# Patient Record
Sex: FEMALE | Race: BLACK OR AFRICAN AMERICAN | NOT HISPANIC OR LATINO | Employment: FULL TIME | URBAN - METROPOLITAN AREA
[De-identification: names, ages, dates, MRNs, and addresses within clinical notes are randomized per-mention and may not be internally consistent; named-entity substitution may affect disease eponyms.]

---
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
## 2022-07-24 ENCOUNTER — HEALTH MAINTENANCE LETTER (OUTPATIENT)
Age: 22
End: 2022-07-24

## 2022-10-03 ENCOUNTER — HEALTH MAINTENANCE LETTER (OUTPATIENT)
Age: 22
End: 2022-10-03

## 2023-08-12 ENCOUNTER — HEALTH MAINTENANCE LETTER (OUTPATIENT)
Age: 23
End: 2023-08-12

## 2023-10-10 ENCOUNTER — HOSPITAL ENCOUNTER (EMERGENCY)
Facility: HOSPITAL | Age: 23
Discharge: HOME | End: 2023-10-10
Attending: EMERGENCY MEDICINE | Admitting: EMERGENCY MEDICINE

## 2023-10-10 VITALS
HEART RATE: 96 BPM | HEIGHT: 64 IN | WEIGHT: 140 LBS | OXYGEN SATURATION: 100 % | DIASTOLIC BLOOD PRESSURE: 80 MMHG | SYSTOLIC BLOOD PRESSURE: 127 MMHG | RESPIRATION RATE: 16 BRPM | TEMPERATURE: 98 F | BODY MASS INDEX: 23.9 KG/M2

## 2023-10-10 DIAGNOSIS — N39.0 UTI (URINARY TRACT INFECTION), UNCOMPLICATED: ICD-10-CM

## 2023-10-10 LAB
ALBUMIN UR-MCNC: 300 MG/DL
APPEARANCE UR: ABNORMAL
BACTERIA #/AREA URNS HPF: ABNORMAL /HPF
BILIRUB UR QL STRIP: NEGATIVE
COLOR UR AUTO: YELLOW
GLUCOSE UR STRIP-MCNC: NEGATIVE MG/DL
HCG UR QL: NEGATIVE
HGB UR QL STRIP: ABNORMAL
KETONES UR STRIP-MCNC: NEGATIVE MG/DL
LEUKOCYTE ESTERASE UR QL STRIP: ABNORMAL
MUCOUS THREADS #/AREA URNS LPF: PRESENT /LPF
NITRATE UR QL: NEGATIVE
PH UR STRIP: 6.5 [PH] (ref 5–7)
RBC URINE: >182 /HPF
SP GR UR STRIP: 1.02 (ref 1–1.03)
SQUAMOUS EPITHELIAL: 9 /HPF
UROBILINOGEN UR STRIP-MCNC: <2 MG/DL
WBC URINE: >182 /HPF

## 2023-10-10 PROCEDURE — 99284 EMERGENCY DEPT VISIT MOD MDM: CPT

## 2023-10-10 PROCEDURE — 81001 URINALYSIS AUTO W/SCOPE: CPT | Performed by: EMERGENCY MEDICINE

## 2023-10-10 PROCEDURE — 250N000011 HC RX IP 250 OP 636

## 2023-10-10 PROCEDURE — 87086 URINE CULTURE/COLONY COUNT: CPT | Performed by: EMERGENCY MEDICINE

## 2023-10-10 PROCEDURE — 96372 THER/PROPH/DIAG INJ SC/IM: CPT

## 2023-10-10 PROCEDURE — 81025 URINE PREGNANCY TEST: CPT | Performed by: EMERGENCY MEDICINE

## 2023-10-10 RX ORDER — KETOROLAC TROMETHAMINE 30 MG/ML
30 INJECTION, SOLUTION INTRAMUSCULAR; INTRAVENOUS ONCE
Status: COMPLETED | OUTPATIENT
Start: 2023-10-10 | End: 2023-10-10

## 2023-10-10 RX ORDER — CIPROFLOXACIN 500 MG/1
500 TABLET, FILM COATED ORAL 2 TIMES DAILY
Qty: 14 TABLET | Refills: 0 | Status: SHIPPED | OUTPATIENT
Start: 2023-10-10 | End: 2023-10-17

## 2023-10-10 RX ADMIN — KETOROLAC TROMETHAMINE 30 MG: 30 INJECTION INTRAMUSCULAR; INTRAVENOUS at 09:41

## 2023-10-10 NOTE — DISCHARGE INSTRUCTIONS
Interval History: Complains of intractable nausea/vomiting. Vomited upon entering room.    Review of Systems: 10 systems reviewed all pertinent positives and negatives stated in HPI, otherwise negative.    Objective:     Vital Signs (Most Recent):  Temp: 98.9 °F (37.2 °C) (05/25/22 0529)  Pulse: 101 (05/25/22 0529)  Resp: 18 (05/24/22 1949)  BP: (!) 141/92 (05/25/22 0529)  SpO2: 100 % (05/25/22 0816)   Vital Signs (24h Range):  Temp:  [98.4 °F (36.9 °C)-98.9 °F (37.2 °C)] 98.9 °F (37.2 °C)  Pulse:  [] 101  Resp:  [18] 18  SpO2:  [97 %-100 %] 100 %  BP: (141-187)/() 141/92     Weight: 63.4 kg (139 lb 12.8 oz)  Body mass index is 25.57 kg/m².    Intake/Output Summary (Last 24 hours) at 5/25/2022 0828  Last data filed at 5/24/2022 1800  Gross per 24 hour   Intake 240 ml   Output --   Net 240 ml      Physical Exam  Constitutional:       Appearance: Normal appearance.   HENT:      Head: Normocephalic and atraumatic.      Nose: Nose normal.      Mouth/Throat:      Mouth: Mucous membranes are moist.      Pharynx: Oropharynx is clear.   Eyes:      Extraocular Movements: Extraocular movements intact.      Pupils: Pupils are equal, round, and reactive to light.   Cardiovascular:      Rate and Rhythm: Normal rate and regular rhythm.      Heart sounds: Normal heart sounds.   Pulmonary:      Effort: Pulmonary effort is normal.      Breath sounds: Normal breath sounds.   Abdominal:      General: Abdomen is flat. There is no distension.      Palpations: Abdomen is soft.      Tenderness: There is no abdominal tenderness.   Musculoskeletal:         General: Normal range of motion.      Cervical back: Normal range of motion. No rigidity.   Skin:     General: Skin is warm and dry.   Neurological:      General: No focal deficit present.      Mental Status: She is alert and oriented to person, place, and time. Mental status is at baseline.   Psychiatric:         Attention and Perception: Attention normal.         Mood and  You were seen today in the emergency department for your symptoms. I sent a prescription for a course of antibiotics to treat an Urinary Tract Infection.  Please follow-up with a primary care provider for symptom recheck.  Please return to the emergency department if you develop any new or worsening symptoms including fevers, chills, back pain, worse abdominal pain, increased blood in your urine.    Affect: Mood is depressed. Affect is flat.         Speech: Speech normal.         Behavior: Behavior normal. Behavior is cooperative.         Thought Content: Thought content normal.         Cognition and Memory: Cognition and memory normal.         Judgment: Judgment normal.        Significant Labs: All pertinent labs within the past 24 hours have been reviewed.    Significant Imaging: I have reviewed all pertinent imaging results/findings within the past 24 hours.

## 2023-10-10 NOTE — ED PROVIDER NOTES
Emergency Department Midlevel Supervisory Note    Date/Time:10/10/2023 8:38 AM    I personally saw the patient and performed a substantive portion of the visit including all aspects of the medical decision making.    I am seeing this patient along with Sunitha Michel PA-C.  I, Mian Yates D.O., have reviewed the documentation, personally taken the patient's history, performed an exam and agree with the physical finds, diagnosis and management plan.    Prior records were reviewed.  I personally performed history and physical.  I personally reviewed lab, EKG, and radiology results as indicated.  Care was discussed with mid-level provider.  Diagnosis and disposition were discussed.  Final disposition will be per the [facility] depending diagnostic studies and patient's clinical trajectory.      ED Course:  9:17 AM Patient was staffed with me by Sunitha Michel PA-C.  9:20 AM I met with the patient to gather history and to perform my initial exam. I discussed the plan for care while in the Emergency Department. Appropriate PPE was worn during patient encounters.    The patient presented to the emergency department today complaining of lower abdominal discomfort and dysuria and.  There is mild tenderness over the bladder on exam.  She does have white blood cells present on her urinalysis.  Given her symptoms, I feel would be reasonable to start her on antibiotics for a presumptive UTI.  She otherwise appears well and I feel she can be safely discharged home.  No other testing felt necessary at this time.  She has been instructed to return for worsening symptoms or other concerns.        DIAGNOSIS:  1. UTI (urinary tract infection), uncomplicated          Medications:  Discharge Medication List as of 10/10/2023  9:56 AM        START taking these medications    Details   ciprofloxacin (CIPRO) 500 MG tablet Take 1 tablet (500 mg) by mouth 2 times daily for 7 days, Disp-14 tablet, R-0, E-Prescribe             Labs and  Imaging:  Results for orders placed or performed during the hospital encounter of 10/10/23   UA with Microscopic reflex to Culture    Specimen: Urine, Midstream   Result Value Ref Range    Color Urine Yellow Colorless, Straw, Light Yellow, Yellow    Appearance Urine Cloudy (A) Clear    Glucose Urine Negative Negative mg/dL    Bilirubin Urine Negative Negative    Ketones Urine Negative Negative mg/dL    Specific Gravity Urine 1.020 1.001 - 1.030    Blood Urine >1.0 mg/dL (A) Negative    pH Urine 6.5 5.0 - 7.0    Protein Albumin Urine 300 (A) Negative mg/dL    Urobilinogen Urine <2.0 <2.0 mg/dL    Nitrite Urine Negative Negative    Leukocyte Esterase Urine 500 Aleja/uL (A) Negative    Bacteria Urine Many (A) None Seen /HPF    Mucus Urine Present (A) None Seen /LPF    RBC Urine >182 (H) <=2 /HPF    WBC Urine >182 (H) <=5 /HPF    Squamous Epithelials Urine 9 (H) <=1 /HPF   HCG qualitative urine   Result Value Ref Range    hCG Urine Qualitative Negative Negative         I, Ariella Genao, am serving as a scribe to document services personally performed by Dr. Yates based on my observation and the provider's statements to me. I, Mian Yates, DO attest that Ariella Genao is acting in a scribe capacity, has observed my performance of the services and has documented them in accordance with my direction.    Mian Yates D.O.  Emergency Medicine  [facility] EMERGENCY DEPARTMENT  [facility] [facility] [address]  [phone]  Dept: [phone]     Mian Yates MD  11/13/23 0254

## 2023-10-10 NOTE — ED TRIAGE NOTES
"Patient reports that she had a normal period the end of September, then started having vaginal bleeding again October 6.  Pt states that \"not much bleeding now\"- \"wonder if I have a bladder infection.\"  Pt states \"when I pee, it hurts.\"        "

## 2023-10-10 NOTE — ED PROVIDER NOTES
EMERGENCY DEPARTMENT ENCOUNTER      NAME: Madai Sharma  AGE: 22 year old female  YOB: 2000  MRN: [id number]  EVALUATION DATE & TIME: 10/10/23 8:39 AM    PCP: No primary care provider on file.    ED PROVIDER: Sunitha Michel PA-C      CHIEF COMPLAINT:  Hematuria and lower abdominal discomfort      FINAL IMPRESSION:  1. UTI (urinary tract infection), uncomplicated          ED COURSE & MEDICAL DECISION MAKING:  Pertinent Labs & Imaging studies reviewed. (See chart for details)    MDM: The patient is a 22 year old  female with history of bacterial vaginosis infection treated with p.o. metronidazole last month who presents to the Emergency Department for evaluation of lower abdominal discomfort and urinary symptoms.     Initial vitals reviewed and significant for hypertension and minimal tachycardia with heart rate 101 bpm.  She is afebrile.  Repeat set of vitals with normal blood pressure and resolved tachycardia.  On exam she is nontoxic and not ill-appearing resting comfortably in hospital bed.  She has some suprapubic fullness and tenderness to palpation, remainder of abdominal exam benign.  No CVA tenderness.     Differential diagnosis includes but not limited to urinary tract infection, ectopic pregnancy, tubo-ovarian abscess although I think this is less likely as the patient is afebrile, ovarian torsion although I think this is less likely as her pain is midline and constant in nature, sexually transmitted infections, urethral trauma/injury, urethritis, nephrolithiasis, pyelonephritis, urosepsis.      Work up included laboratory studies.  Urine pregnancy test negative.  Urinalysis without nitrites; RBCs, WBCs, protein present.  Although urinalysis not clearly suggestive of urinary tract infection, given patient report of dysuria at the end of her urinary stream as well as urinary frequency and urgency, will empirically treat for UTI.  She received a course of oral metronidazole last  month for bacterial vaginosis, no other recent antibiotic use.  She does not have any concerns for sexually transmitted infections today and in the absence of abnormal vaginal discharge, I think it is reasonable to defer STI testing.    Patient was given 1 dose Toradol for their symptoms with significant improvement.  Plan for discharge to home in stable condition with prescription for a course of Keflex as well as plan to establish care with a primary care provider soon as possible for symptom recheck.  The patient expressed understanding and is in agreement with this plan.  Strict return precautions discussed.       Medical Decision Making    History:  Supplemental history from: Documented in chart, if applicable  External Record(s) reviewed: Documented in chart, if applicable.    Work Up:  Chart documentation includes differential considered and any EKGs or imaging independently interpreted by provider, where specified.  In additional to work up documented, I considered the following work up: Documented in chart, if applicable.    External consultation:  Discussion of management with another provider: Documented in chart, if applicable    Complicating factors:  Care impacted by chronic illness: Smoking / Nicotine Use  Care affected by social determinants of health: Alcohol Abuse and/or Recreational Drug Use    Disposition considerations: Discharge. I prescribed additional prescription strength medication(s) as charted. N/A.      ED COURSE:       9:06 AM I met and introduced myself to the patient. I gathered initial history and performed an initial physical exam. We discussed options and plan for diagnostics and treatment here in the ED.  9:17 AM I have staffed the patient with Dr. Yates, ED physician, who has evaluated the patient and agrees with all aspects of today's care.   9:56 AM I rechecked the patient and discussed results, discharge, follow up, and reasons to return to the ED. We discussed the plan for  discharge and the patient is agreeable. Reviewed supportive cares, symptomatic treatment, outpatient follow up, and reasons to return to the Emergency Department. Patient to be discharged by ED RN.     At the conclusion of the encounter I discussed the results of all the tests and the disposition. The questions were answered. The patient or family acknowledged understanding and was agreeable with the care plan.          MEDICATIONS GIVEN IN THE EMERGENCY:  Medications   ketorolac (TORADOL) injection 30 mg (30 mg Intramuscular $Given 10/10/23 0941)       NEW PRESCRIPTIONS STARTED AT TODAY'S ER VISIT  Discharge Medication List as of 10/10/2023  9:56 AM        START taking these medications    Details   ciprofloxacin (CIPRO) 500 MG tablet Take 1 tablet (500 mg) by mouth 2 times daily for 7 days, Disp-14 tablet, R-0, E-Prescribe                =================================================================    HPI    Patient information was obtained from: Patient    Use of Intrepreter: N/A       Madai Sharma is a 22 year old female with pertinent medical history of smoking who presents with urinary symptoms and abdominal pain.    The patient reports that she was in her normal state of health until 10/6 (~4 days ago) when she developed hematuria with pink-tinged blood when wiping and suprapubic pain. She notes that her LMP ended on 9/31 (~10 days ago). She denies having any similar episodes previously. Endorses dysuria, urgency, and frequency. No exacerbating or relieving factors. Patient states that she was sexually active prior to her LMP. No medications were taken prior to arrival. Denies chance of pregnancy or concern for STDs. Denies fever, chills, back pain, constipation, diarrhea, or vaginal discharge.    Of note, patient was recently prescribed oral metronidazole in September (~1 month ago) at [facility] for BV.      PAST MEDICAL HISTORY:  Past Medical History:   Diagnosis Date    No known health  "problems        PAST SURGICAL HISTORY:  Past Surgical History:   Procedure Laterality Date    NO HISTORY OF SURGERY         CURRENT MEDICATIONS:    Prior to Admission Medications   Prescriptions Last Dose Informant Patient Reported? Taking?   Acetaminophen (TYLENOL 8 HOUR PO)   Yes No   Prenat w/o O-WA-[place]-FA-DHA (PNV-DHA PO)   Yes No      Facility-Administered Medications: None       ALLERGIES:  No Known Allergies    FAMILY HISTORY:  History reviewed. No pertinent family history.    SOCIAL HISTORY:  Social History     Tobacco Use    Smoking status: Some Days    Tobacco comments:     Tobacco from blunts   Substance Use Topics    Alcohol use: Yes     Comment: 2 drinks per week    Drug use: Yes     Types: Marijuana        VITALS:    First Vitals:  Patient Vitals for the past 24 hrs:   BP Temp Temp src Pulse Resp SpO2 Height Weight   10/10/23 0935 127/80 98  F (36.7  C) Oral 96 16 100 % -- --   10/10/23 0832 (!) 152/88 97.2  F (36.2  C) Temporal 101 16 98 % 1.626 m (5' 4\") 63.5 kg (140 lb)       Patient Vitals for the past 24 hrs:   BP Temp Temp src Pulse Resp SpO2 Height Weight   10/10/23 0935 127/80 98  F (36.7  C) Oral 96 16 100 % -- --   10/10/23 0832 (!) 152/88 97.2  F (36.2  C) Temporal 101 16 98 % 1.626 m (5' 4\") 63.5 kg (140 lb)       PHYSICAL EXAM  VITAL SIGNS: /80   Pulse 96   Temp 98  F (36.7  C) (Oral)   Resp 16   Ht 1.626 m (5' 4\")   Wt 63.5 kg (140 lb)   LMP 09/26/2023   SpO2 100%   BMI 24.03 kg/m     GENERAL: Awake, alert, answering questions appropriately, resting comfortably on hospital bed.  SPEECH:  Easy to understand speech, Normal volume and roderick.  PULMONARY: No respiratory distress, Breathing comfortably on room air. Lungs clear to auscultation bilaterally.  CARDIOVASCULAR: Regular rate and rhythm, radial pulses present and normal.  ABDOMINAL: Soft, suprapubic fullness and tenderness to palpation, No rebound or guarding.  BACK: No CVA tenderness.  EXTREMITIES: Extremities are " warm and well perfused.   NEUROLOGIC: Moving all extremities spontaneously.   SKIN: Exposed areas of skin warm, dry, no rashes.  PSYCH: Normal mood and affect.         RADIOLOGY/LAB:  Reviewed all pertinent imaging. Please see official radiology report.  All pertinent labs reviewed and interpreted.  Results for orders placed or performed during the hospital encounter of 10/10/23   UA with Microscopic reflex to Culture    Specimen: Urine, Midstream   Result Value Ref Range    Color Urine Yellow Colorless, Straw, Light Yellow, Yellow    Appearance Urine Cloudy (A) Clear    Glucose Urine Negative Negative mg/dL    Bilirubin Urine Negative Negative    Ketones Urine Negative Negative mg/dL    Specific Gravity Urine 1.020 1.001 - 1.030    Blood Urine >1.0 mg/dL (A) Negative    pH Urine 6.5 5.0 - 7.0    Protein Albumin Urine 300 (A) Negative mg/dL    Urobilinogen Urine <2.0 <2.0 mg/dL    Nitrite Urine Negative Negative    Leukocyte Esterase Urine 500 Aleja/uL (A) Negative    Bacteria Urine Many (A) None Seen /HPF    Mucus Urine Present (A) None Seen /LPF    RBC Urine >182 (H) <=2 /HPF    WBC Urine >182 (H) <=5 /HPF    Squamous Epithelials Urine 9 (H) <=1 /HPF   HCG qualitative urine   Result Value Ref Range    hCG Urine Qualitative Negative Negative           I, Ariella Genao, am serving as a scribe to document services personally performed by Sunitha Michel PA-C, based on my observation and the provider's statements to me. ISunitha PA-C attest that Ariella Genao is acting in a scribe capacity, has observed my performance of the services and has documented them in accordance with my direction.         Sunitha Michel PA-C  Emergency Medicine  [facility] EMERGENCY DEPARTMENT  [facility] [facility] [address] [phone]  Dept: [phone]     Sunitha Michel PA-C  10/10/23 5506

## 2023-10-11 LAB
BACTERIA UR CULT: ABNORMAL
BACTERIA UR CULT: ABNORMAL

## 2024-10-05 ENCOUNTER — HEALTH MAINTENANCE LETTER (OUTPATIENT)
Age: 24
End: 2024-10-05